# Patient Record
Sex: MALE | Employment: OTHER
[De-identification: names, ages, dates, MRNs, and addresses within clinical notes are randomized per-mention and may not be internally consistent; named-entity substitution may affect disease eponyms.]

---

## 2023-10-04 ENCOUNTER — TELEPHONE (OUTPATIENT)
Facility: HOSPITAL | Age: 43
End: 2023-10-04

## 2023-10-18 ENCOUNTER — TELEPHONE (OUTPATIENT)
Facility: HOSPITAL | Age: 43
End: 2023-10-18

## 2023-10-18 NOTE — TELEPHONE ENCOUNTER
Attempted to call work number. The number did not work. Left VM on patient's cell returning call to set up evaluation.

## 2023-11-13 ENCOUNTER — HOSPITAL ENCOUNTER (OUTPATIENT)
Facility: HOSPITAL | Age: 43
Setting detail: RECURRING SERIES
Discharge: HOME | End: 2023-11-16
Payer: OTHER GOVERNMENT

## 2023-11-13 PROCEDURE — 97161 PT EVAL LOW COMPLEX 20 MIN: CPT

## 2023-11-13 PROCEDURE — 97110 THERAPEUTIC EXERCISES: CPT

## 2023-11-28 ENCOUNTER — HOSPITAL ENCOUNTER (OUTPATIENT)
Facility: HOSPITAL | Age: 43
Setting detail: RECURRING SERIES
Discharge: HOME | End: 2023-12-01
Payer: OTHER GOVERNMENT

## 2023-11-28 PROCEDURE — 97112 NEUROMUSCULAR REEDUCATION: CPT

## 2023-11-28 PROCEDURE — 97110 THERAPEUTIC EXERCISES: CPT

## 2023-11-28 PROCEDURE — 97530 THERAPEUTIC ACTIVITIES: CPT

## 2023-11-28 NOTE — PROGRESS NOTES
Current:Same as IE                    Patient will report pain less than 1-2/10 average to aid in completion of ADLs. Status at IE:0-8/10                 Current:Same as IE                    Patient will perform 10# pain free kettle bell squats with 40lbs with good form/technique to aid in completion of ADLs. Status at [address] with STS                 Current:Same as IE     Patient will improve FOTO to 72 points overall to demonstrate improvement in functional ability.                  Status at IE:FOTO score = 61 (an established functional score where 100 = no disability)                 Current: Same as IE    PLAN  Yes  Continue plan of care  []  Upgrade activities as tolerated  []  Discharge due to :  []  Other:    Sheng Slade PT    11/28/2023    11:25 AM    Future Appointments   Date Time Provider [address]   11/30/2023  8:10 AM Jose Gamino, PT [facility]   12/5/2023  8:10 AM Allyne Esperanza, PT [facility]   12/7/2023  8:10 AM Allyne Esperanza, PT [facility]   12/12/2023  8:10 AM Allyne Esperanza, PT [facility]   12/14/2023  8:10 AM Allyne Esperanza, PT [facility]   12/19/2023  8:10 AM Allyne Esperanza, PT [facility]   12/21/2023  8:10 AM Allyne Esperanza, PT [facility]

## 2023-11-30 ENCOUNTER — HOSPITAL ENCOUNTER (OUTPATIENT)
Facility: HOSPITAL | Age: 43
Setting detail: RECURRING SERIES
End: 2023-11-30
Payer: OTHER GOVERNMENT

## 2023-11-30 PROCEDURE — 97530 THERAPEUTIC ACTIVITIES: CPT

## 2023-11-30 PROCEDURE — 97112 NEUROMUSCULAR REEDUCATION: CPT

## 2023-11-30 PROCEDURE — 97110 THERAPEUTIC EXERCISES: CPT

## 2023-11-30 NOTE — PROGRESS NOTES
PHYSICAL / OCCUPATIONAL THERAPY - DAILY TREATMENT NOTE (updated )    Patient Name: Anshu Brian    Date: 2023    : 1980  Insurance: Payor:  EAST / Plan:  EAST / Product Type: *No Product type* /      Patient  verified Yes     Visit #   Current / Total 3 15   Time   In / Out [id number]   Pain   In / Out 2 1-2   Subjective Functional Status/Changes: \"My hip and my knee were both pretty sore after the last session. The hip is still bothering me a bit. \"   Changes to:  Meds, Allergies, Med Hx, Sx Hx? If yes, update Summary List no       TREATMENT AREA =  Pain in right knee [M25.561]    OBJECTIVE    Therapeutic Procedures: Tx Min Billable or 1:1 Min (if diff from Tx Min) Procedure, Rationale, Specifics   28  28462 Therapeutic Exercise (timed):  increase ROM, strength, coordination, balance, and proprioception to improve patient's ability to progress to PLOF and address remaining functional goals. (see flow sheet as applicable)     Details if applicable:       15  41869 Therapeutic Activity (timed):  use of dynamic activities replicating functional movements to increase ROM, strength, coordination, balance, and proprioception in order to improve patient's ability to progress to PLOF and address remaining functional goals. (see flow sheet as applicable)     Details if applicable:     10  58379 Neuromuscular Re-Education (timed):  improve balance, coordination, kinesthetic sense, posture, core stability and proprioception to improve patient's ability to develop conscious control of individual muscles and awareness of position of extremities in order to progress to PLOF and address remaining functional goals.  (see flow sheet as applicable)     Details if applicable:     62  [address] Totals Reminder: bill using total billable min of TIMED therapeutic procedures (example: do not include dry needle or estim unattended, both untimed codes, in totals to left)  8-22 min = 1 unit; 23-37 min = 2 units;

## 2023-12-05 ENCOUNTER — HOSPITAL ENCOUNTER (OUTPATIENT)
Facility: HOSPITAL | Age: 43
Setting detail: RECURRING SERIES
Discharge: HOME | End: 2023-12-08
Payer: OTHER GOVERNMENT

## 2023-12-05 PROCEDURE — 97112 NEUROMUSCULAR REEDUCATION: CPT

## 2023-12-05 PROCEDURE — 97530 THERAPEUTIC ACTIVITIES: CPT

## 2023-12-05 PROCEDURE — 97110 THERAPEUTIC EXERCISES: CPT

## 2023-12-05 NOTE — PROGRESS NOTES
Current:Same as IE                    Patient will report pain less than 1-2/10 average to aid in completion of ADLs. Status at IE:0-8/10                 Current:Same as IE                    Patient will perform 10# pain free kettle bell squats with 40lbs with good form/technique to aid in completion of ADLs. Status at [address] with STS                 Current:Same as IE     Patient will improve FOTO to 72 points overall to demonstrate improvement in functional ability.                  Status at IE:FOTO score = 61 (an established functional score where 100 = no disability)                 Current: Same as IE    PLAN  Yes  Continue plan of care  []  Upgrade activities as tolerated  []  Discharge due to:   []  Other:    Fernando Markham PT    12/5/2023    8:14 AM    Future Appointments   Date Time Provider [address]   12/7/2023  8:10 AM Fernando Markham PT [facility]   12/12/2023  8:10 AM Fernando Markham PT [facility]   12/14/2023  8:10 AM Fernando Markham PT [facility]   12/19/2023  8:10 AM Fernando Markham PT [facility]   12/21/2023  8:10 AM Fernando Markham PT [facility]

## 2023-12-07 ENCOUNTER — HOSPITAL ENCOUNTER (OUTPATIENT)
Facility: HOSPITAL | Age: 43
Setting detail: RECURRING SERIES
Discharge: HOME | End: 2023-12-10
Payer: OTHER GOVERNMENT

## 2023-12-07 PROCEDURE — 97530 THERAPEUTIC ACTIVITIES: CPT

## 2023-12-07 PROCEDURE — 97110 THERAPEUTIC EXERCISES: CPT

## 2023-12-07 PROCEDURE — 97112 NEUROMUSCULAR REEDUCATION: CPT

## 2023-12-07 NOTE — PROGRESS NOTES
average to aid in completion of ADLs. Status at IE:0-8/10                 Current:Same as IE                    Patient will perform 10# pain free kettle bell squats with 40lbs with good form/technique to aid in completion of ADLs. Status at [address] [place] [address] Street with STS                 Current: Currently only able to tolerates squats with TRX assist.           In-progress, 12/7/2023     Patient will improve FOTO to 72 points overall to demonstrate improvement in functional ability.                  Status at IE:FOTO score = 61 (an established functional score where 100 = no disability)                 Current: Same as IE    PLAN  Yes  Continue plan of care  []  Upgrade activities as tolerated  []  Discharge due to:   []  Other:    Leonie Miller, PT    12/7/2023    8:06 AM    Future Appointments   Date Time Provider [address]   12/7/2023  8:10 AM Justice Gamino Post, PT [facility]   12/12/2023  8:10 AM Beula Record, PT [facility]   12/14/2023  8:10 AM Beula Record, PT [facility]   12/19/2023  8:10 AM Beula Record, PT [facility]   12/21/2023  8:10 AM Beula Record, PT [facility]

## 2023-12-12 ENCOUNTER — HOSPITAL ENCOUNTER (OUTPATIENT)
Facility: HOSPITAL | Age: 43
Setting detail: RECURRING SERIES
Discharge: HOME | End: 2023-12-15
Payer: OTHER GOVERNMENT

## 2023-12-12 PROCEDURE — 97530 THERAPEUTIC ACTIVITIES: CPT

## 2023-12-12 PROCEDURE — 97110 THERAPEUTIC EXERCISES: CPT

## 2023-12-12 PROCEDURE — 97112 NEUROMUSCULAR REEDUCATION: CPT

## 2023-12-12 NOTE — PROGRESS NOTES
[facility], [address] [place] [address] Drive  Phone: [phone]   Fax: [phone]    Progress Note  Patient name: Anshu Brian Start of Care: 2023    Referral source: Cristobal Summers : 1980   Medical/Treatment Diagnosis: Pain in right knee [M25.561] Onset Date:2018      Prior Hospitalization: see medical history Provider#: [id number]   Medications: Verified on Patient Summary List    Comorbidities: high cholesterol   Prior Level of Function:running, exercise, ADAF     Visits from Start of Care: 6    Missed Visits: 0    Updated Goals/Measure of Progress:     Short Term Goals: To be accomplished in 4 weeks:                 Patient will report compliance with HEP at least 1x/day to aid in rehabilitation program.                 Status at IE:will provide HEP on second visit                 Current: Met, 2023     Long Term Goals: To be accomplished in 8 weeks:                    Patient will report compliance with HEP a least 3-4x/week to aid in rehabilitation/strengthening program.                 Status at IE: NA                 Current:Same as IE                    Patient will increase strength to 5/5 throughout bilateral LEs to aid in completion of ADLs. Status at IE:4-/5                 Current: In-progress, 4, 2023                    Patient will report pain less than 1-2/10 average to aid in completion of ADLs. Status at IE:0-8/10                 Current: In-progress, 0-410, 2023                    Patient will perform 10# pain free kettle bell squats with 40lbs with good form/technique to aid in completion of ADLs. Status at [address] [place] [address] with STS                 Current: Currently only able to tolerates squats with TRX assist.           In-progress, 2023     Patient will improve FOTO to 72 points overall to demonstrate improvement in functional ability.                  Status at
= 3 units; 53-67 min = 4 units; 68-82 min = 5 units   Total Total       TOTAL TREATMENT TIME:     53       [x]  Patient Education billed concurrently with other procedures   [x] Review HEP    [] Progressed/Changed HEP, detail:    [] Other detail:       Objective Information/Functional Measures/Assessment    Patient is improving as he has met 1/1 STGs and is progressing toward his remaining goals. He is compliant with initial HEP. He he reporting less pain with functional activities and is demonstrating improved activity toerlance. He continues to demonstrate limited LE strength and right knee stability. Patient will continue to benefit from skilled PT / OT services to modify and progress therapeutic interventions, analyze and address functional mobility deficits, analyze and address ROM deficits, analyze and address strength deficits, analyze and address soft tissue restrictions, analyze and cue for proper movement patterns, analyze and modify for postural abnormalities, analyze and address imbalance/dizziness, and instruct in home and community integration to address functional deficits and attain remaining goals. Progress toward goals / Updated goals:  []  See Progress Note/Recertification    Short Term Goals: To be accomplished in 4 weeks:                 Patient will report compliance with HEP at least 1x/day to aid in rehabilitation program.                 Status at IE:will provide HEP on second visit                 Current: Met, 12/12/2023     Long Term Goals: To be accomplished in 8 weeks:                    Patient will report compliance with HEP a least 3-4x/week to aid in rehabilitation/strengthening program.                 Status at IE: NA                 Current:Same as IE                    Patient will increase strength to 5/5 throughout bilateral LEs to aid in completion of ADLs. Status at IE:4-/5                 Current:  In-progress, 4/5, 12/12/2023                    Patient

## 2023-12-21 ENCOUNTER — HOSPITAL ENCOUNTER (OUTPATIENT)
Facility: HOSPITAL | Age: 43
Setting detail: RECURRING SERIES
Discharge: HOME | End: 2023-12-24
Payer: OTHER GOVERNMENT

## 2023-12-21 PROCEDURE — 97530 THERAPEUTIC ACTIVITIES: CPT

## 2023-12-21 PROCEDURE — 97112 NEUROMUSCULAR REEDUCATION: CPT

## 2023-12-21 PROCEDURE — 97110 THERAPEUTIC EXERCISES: CPT

## 2023-12-21 NOTE — PROGRESS NOTES
completion of ADLs. Status at IE:0-8/10                 Current: In-progress, 0-4/10, 12/12/2023                    Patient will perform 10# pain free kettle bell squats with 40lbs with good form/technique to aid in completion of ADLs. Status at [address] [place] [address] with STS                 Current: Currently only able to tolerates squats with TRX assist.           In-progress, 12/7/2023     Patient will improve FOTO to 72 points overall to demonstrate improvement in functional ability. Status at IE:FOTO score = 61 (an established functional score where 100 = no disability)                 Current: In-progress, 61, 12/12/2023    PLAN  Yes  Continue plan of care  []  Upgrade activities as tolerated  []  Discharge due to:   []  Other:    Rafael Schreiber, PT    12/21/2023    8:13 AM    No future appointments.

## 2024-01-04 ENCOUNTER — TELEPHONE (OUTPATIENT)
Facility: HOSPITAL | Age: 44
End: 2024-01-04